# Patient Record
Sex: FEMALE | Race: WHITE | NOT HISPANIC OR LATINO | Employment: FULL TIME | URBAN - METROPOLITAN AREA
[De-identification: names, ages, dates, MRNs, and addresses within clinical notes are randomized per-mention and may not be internally consistent; named-entity substitution may affect disease eponyms.]

---

## 2018-10-10 ENCOUNTER — APPOINTMENT (OUTPATIENT)
Dept: WOMENS IMAGING | Facility: HOSPITAL | Age: 48
End: 2018-10-10

## 2018-10-10 PROCEDURE — 77067 SCR MAMMO BI INCL CAD: CPT | Performed by: RADIOLOGY

## 2018-10-10 PROCEDURE — 77063 BREAST TOMOSYNTHESIS BI: CPT | Performed by: RADIOLOGY

## 2018-10-10 PROCEDURE — MDREVIEWSP: Performed by: RADIOLOGY

## 2018-10-14 ENCOUNTER — TELEPHONE (OUTPATIENT)
Dept: SURGERY | Facility: CLINIC | Age: 48
End: 2018-10-14

## 2018-10-14 DIAGNOSIS — R92.8 ABNORMAL MAMMOGRAM: Primary | ICD-10-CM

## 2018-10-15 ENCOUNTER — TELEPHONE (OUTPATIENT)
Dept: SURGERY | Facility: CLINIC | Age: 48
End: 2018-10-15

## 2018-10-15 NOTE — TELEPHONE ENCOUNTER
Scheduled Bilateral Breast U/S at [facility]  10-17-18 @ 2:30  Return to see Dr WILEY 11-8-18 arrive 11:15    LM for pt to call    kota

## 2018-10-17 ENCOUNTER — APPOINTMENT (OUTPATIENT)
Dept: WOMENS IMAGING | Facility: HOSPITAL | Age: 48
End: 2018-10-17

## 2018-10-17 PROCEDURE — 76641 ULTRASOUND BREAST COMPLETE: CPT | Performed by: RADIOLOGY

## 2018-10-25 ENCOUNTER — TELEPHONE (OUTPATIENT)
Dept: SURGERY | Facility: CLINIC | Age: 48
End: 2018-10-25

## 2018-10-25 NOTE — TELEPHONE ENCOUNTER
[facility] October 17th 2018 bilateral breast ultrasound: 1 focal asymmetries additional evaluation no discrete or suspicious sonographic abnormality.  Finding 2 solid mass with partially defined margins 2.6 x 2.4 cm subcutaneous left breast 5:00, 2 cm from the nipple.  Benign.  BI-RADS 2

## 2018-11-08 ENCOUNTER — OFFICE VISIT (OUTPATIENT)
Dept: SURGERY | Facility: CLINIC | Age: 48
End: 2018-11-08

## 2018-11-08 VITALS
WEIGHT: 117 LBS | DIASTOLIC BLOOD PRESSURE: 76 MMHG | HEART RATE: 68 BPM | SYSTOLIC BLOOD PRESSURE: 122 MMHG | BODY MASS INDEX: 19.97 KG/M2 | HEIGHT: 64 IN | OXYGEN SATURATION: 99 %

## 2018-11-08 DIAGNOSIS — D24.2 FIBROADENOMA OF LEFT BREAST: Primary | ICD-10-CM

## 2018-11-08 DIAGNOSIS — N60.12 FIBROCYSTIC DISEASE OF LEFT BREAST: ICD-10-CM

## 2018-11-08 PROCEDURE — 99213 OFFICE O/P EST LOW 20 MIN: CPT | Performed by: SURGERY

## 2018-11-08 NOTE — PROGRESS NOTES
Chief Complaint: Lula Reyes is a 48 y.o. female who was seen in consultation at the request of No ref. provider found  for Fibroadenoma of breast left and a followup visit    History of Present Illness:  Patient presents with a LEFT breast mass, abnormal LEFT breast imaging, and LEFT skin changes. She has noted a LEFT breast mass for a few years that has not changed in size, LEFT LOQ, mild associated tenderness with palpation, no skin changes at this site. She has assumed that this was a cyst.  She has had imaging surveillance of this since 9-2014 that has shown the lesion to be stable at 2 cm.    She noted in February to March LEFt breast skin changes between 6-8:00. She noted no mass, associated nipple changes at that time. Resolved with antibiotics.    She noted no other masses, skin changes, nipple discharge, nipple changes prior to her most recent imaging.   Her most recent imaging includes:  06-22-12       Mammo Screening Bilateral     [facility]      Lula Reyes  This is a baseline study.    The breast are extremely dense    Birads Category 1:  Negative  09-04-14       Yearly Screening Mammogram     [facility]                         AmyFriedat  Bilateral Mammogrphy  The breast are extremely dense,  Finding 1:  asymmetry measuring 18 millimeters posterior one-third superior left rbeast  located 11 centimeters from the nipple.  Finding 2:  fat containing, oval mass 2 cm anterior one-third of the left breast at 5 o'clock  This finding is  a hamartoma.  Bilateral Ultrasound  Finding 1:  No suspicious sonographic abnormality is seen  Finding 2:  elongated area of mixed echognology   Finding 3:  several small simple cysts.  Impression:  Finding 1:  Old Films are required  Finding 2:  Benign-Negative  Finding 3:  Benign-Negative  Birads Category 0:    10-28-14       Left Diagnostic Mammography        [facility]                          AmyLula  The breast are extremely dense  Finding 1:   asymmetry in the left breast does not persist.  Finding 2:  oval mass in the left breast fat containing, oval mass 2 cm anterior one-third region  left breast at 5 o'clock.  This finding is a hamartoma.  Left Breast Ultrasound:  Finding 1:  Additionl evaluation asymmetry in the left breast.  There is not evidence of any solid mass or abnormal cystic elements  Finding 3:  few oval simple cysts.  Impression:  Finding 1:  Benign-Negative  Finding 2:  Probably Benign, Follow Up Mammography in 6 months.  Follow Up Ultrasound of the left rbast in 6 months.  Finding 3:  Benign-Negative  Birads Category 3:  Probably Benign    06-09-15       Left Diagnostic Mammogram        [facility]                             Lula Reyes  Finding 1:  Follow-up oval mass in the left breast, 5 o'clock  fat containing, oval mass 2 cm anterior one-third left rbast at 5 o'clock. Mass correlates  to the palpable abnormality in the left breast at 5 o'clock  Finding 2:  Follow up asymmetry in the left breast, superior stable asymmetry measuring 18  millimeters in the posterior one-third superior. left breast located 11 centimeters from the nipple.  Left Breast Ultrasound  Finding 2:  no evidence of any solid mass or abnormal cystic elements.    Finding 3: simple cyst.  Impression:  Finding 1:  Anterior one-third left breast 5 o'clock is probably benign.  ultrasound of the left breast in 6 months.  Finding 2:  asymmetry posterior one-third superior left breast 11 centimeters from the nipple is benign-negative.  Finding 3:  Benign-Negative  Birads Category 3:  Probably Benign    She has not had  a breast biopsy in the past.  She has her uterus and ovaries, is premenopausal, and takes no hormones.  Her family history includes the following: She has no sisters, one brother, no aunts or uncles. No breast cancer or ovarian cancer in her family history.         In the interim,  Mrs. Reyes  has done well.  She has noted no changes in her breast exam. No  new masses, skin changes,  nipple changes, nipple discharge either breast.     Her most recent imaging includes the followin/08/15    [facility]      BILATERAL SCREENING MAMMOGRAM WITH TOMOSYNTHESIS                Lula Reyes   The breasts are extremely dense.   Finding 1: stable asymmetric breast tissue seen in both breasts.   LEFT BREAST ULTRASOUND   Finding 2: followup oval mass left breast 5 o'clock 17 x 5 x 10 mm anterior one-third left breast at 5 o'clock. No significant change.   Finding 3: Oval complicated cyst 6 mm left breast 2 o'clock.   IMPRESSION:   Finding 1: benign negative   Finding 2: solid mass anterior one third left breast 5 o'clock probably benign. Ultrasound of the left breast in 6 months.   Finding 3: Complicated cyst left breast at 2 o'clock is probably benign. Ultrasound of the left breast in 6 months.   BIRADS Category 3: Probably benign     She has started back the sertraline.    In the interim,  Lula Reyes  has done well.  She has noted no changes in her breast exam. No new masses, skin changes, nipple changes, nipple discharge either breast.     Her most recent imaging includes the following:  [facility] 2016 the 2 cysts in surveillance left breast 2:00 and left breast 12:00 were stable and felt to be benign.  The solid mass left breast 5:00 anterior one third measured 2.4 cm.  This had increased from 2.0 cm.  BI-RADS 4A.  They then performed an ultrasound guided biopsy and this returned as benign fibrofatty breast tissue with apocrine metaplasia, ductal dilation and ectasia, fibroadenomatoid hyperplasia and sclerosing adenosis.    She is here to follow-up those results and to discuss management.      Interval History:  In the interim,  Lula Reyes  has done well.  She has noted no changes in her breast exam. No new masses, skin changes, nipple changes, nipple discharge either breast.     Her most recent imaging includes the  following:  Most recent imaging includes 10-10-18 bilateral screen mammogram with 3D at [facility]- showed extremely dense tissue- new focal asymmetries bilateral requires additional imaging with ultrasound- BR0.      [facility] October 17th 2018 bilateral breast ultrasound: 1 focal asymmetries additional evaluation no discrete or suspicious sonographic abnormality.  Finding 2 solid mass with partially defined margins 2.6 x 2.4 cm subcutaneous left breast 5:00, 2 cm from the nipple.  Benign.  BI-RADS 2  No significant change from priors.    She is here to review.      Review of Systems:  Review of Systems   Constitutional: Negative for unexpected weight change (5.8lb weight loss).   All other systems reviewed and are negative.       Past Medical and Surgical History:  Breast Biopsy History:  Fibroadenoma of breast, left and a followup visit  Breast Cancer HIstory:  Patient does not have a past medical history of breast cancer.  Breast Operations, and year:  none  Obstetric/Gynecologic History:  Age menstrual periods began: 13 yrs old   Patient is premenopausal, first day of last period: mid july 2015.  Premenopausal, regular.  Number of pregnancies: 1  Number of live births: 0  Number of abortions or miscarriages: 1  Age of delivery of first child: n/a  breast feeding: n/a  Length of time taking birth control pills: n/a  Patient has never taken hormone replacement    Past Surgical History:   Procedure Laterality Date   • LEEP N/A 3/7/2016    Procedure: LOOP ELECTROCAUTERY EXCISION PROCEDURE;  Surgeon: Erica Hinton MD;  Location: [facility];  Service:    • MYRINGOTOMY W/ TUBES     • TONSILLECTOMY         Past Medical History:   Diagnosis Date   • Asthma    • Dysplasia of cervix    • PONV (postoperative nausea and vomiting)        Prior Hospitalizations, other than for surgery or childbirth, and year:  n/a    Social History     Social History   • Marital status: Unknown     Spouse name: N/A   • Number of  "children: N/A   • Years of education: N/A     Occupational History   • Not on file.     Social History Main Topics   • Smoking status: Never Smoker   • Smokeless tobacco: Not on file   • Alcohol use Not on file      Comment: OCCASIONALLY   • Drug use: No   • Sexual activity: Not on file     Other Topics Concern   • Not on file     Social History Narrative   • No narrative on file     Patient is .  Patient is employed full time with the following occupation:   Patient drinks 2 servings of caffeine per day.    Family History:  Family History   Problem Relation Age of Onset   • No Known Problems Mother    • No Known Problems Father    • No Known Problems Sister    • No Known Problems Brother    • No Known Problems Maternal Grandmother    • No Known Problems Maternal Grandfather    • No Known Problems Paternal Grandmother    • No Known Problems Paternal Grandfather        Vital Signs:  /76 (BP Location: Left arm, Patient Position: Sitting, Cuff Size: Adult)   Pulse 68   Ht 162.6 cm (64\")   Wt 53.1 kg (117 lb)   SpO2 99%   BMI 20.08 kg/m²      Medications:    Current Outpatient Prescriptions:   •  albuterol (PROVENTIL HFA;VENTOLIN HFA) 108 (90 BASE) MCG/ACT inhaler, Inhale 2 puffs every 4 (four) hours as needed for shortness of breath., Disp: , Rfl:   •  Calcium Carbonate-Vit D-Min (CALCIUM 1200 PO), Take  by mouth., Disp: , Rfl:   •  Cholecalciferol (VITAMIN D3) 31389 UNITS tablet, Take 1 tablet by mouth 1 (one) time per week. Weekly in winter Monthly in summer , Disp: , Rfl:   •  FLUoxetine (PROzac) 10 MG capsule, Take 10 mg by mouth every morning., Disp: , Rfl:   •  magnesium 30 MG tablet, Take  by mouth 2 (two) times a day., Disp: , Rfl:   •  Multiple Vitamins-Minerals (IMMUNE SYSTEM BOOSTER PO), Take  by mouth., Disp: , Rfl:      Allergies:  No Known Allergies    Physical Examination:  General Appearance:  Patient is in no distress.  She is well kept and has a  thin  build. "   Psychiatric:  Patient with appropriate mood and affect. Alert and oriented to self, time, and place.    Breast, RIGHT: large  sized, symmetric with the contralateral side.  Breast skin is without erythema, edema, rashes.  There are no visible abnormalities upon inspection during the arm-raising maneuver or with hands on hips in the sitting position. There is no nipple retraction, discharge or nipple/areolar skin changes.There are no masses palpable in the sitting or supine positions.    Breast, LEFT:  large medium  sized, symmetric with the contralateral side.  Breast skin is without erythema, edema, rashes.  There are no visible abnormalities upon inspection during the arm-raising maneuver or with hands on hips in the sitting position. There is no nipple retraction, discharge or nipple/areolar skin changes. There is a stable 3 cm palpable mass that is nontender at the LEFT 5:00 4 CFN location. This is the site of known biopsy proven fibroadenomatoid changes and FCC. More appreciable on exam in the supine versus the sitting position. No overlying skin changes. There are no other masses palpable in the sitting or supine positions.     Lymphatic:  There is no axillary, cervical, infraclavicular, or supraclavicular adenopathy bilaterally.  Eyes:  Pupils are round and reactive to light.  Cardiovascular:  Heart rate and rhythm are regular.  Respiratory:  Lungs are clear bilaterally with no crackles or wheezes in any lung field.  Gastrointestinal:  Abdomen is soft, nondistended, and nontender.  Musculoskeletal:  Good strength in all 4 extremities.  There is good range of motion in both shoulders.   Skin:  No new skin lesions or rashes on the skin excluding the breast (see breast exam above).            Imagin12       Mammo Screening Bilateral     [facility]  This is a baseline study.    The breast are extremely dense    Birads Category 1:  Negative  14       Yearly Screening  Mammogram     [facility]                         Lula Reyes  Bilateral Mammogrphy  The breast are extremely dense,  Finding 1:  asymmetry measuring 18 millimeters posterior one-third superior left rbeast  located 11 centimeters from the nipple.  Finding 2:  fat containing, oval mass 2 cm anterior one-third of the left breast at 5 o'clock  This finding is  a hamartoma.  Bilateral Ultrasound  Finding 1:  No suspicious sonographic abnormality is seen  Finding 2:  elongated area of mixed echognology   Finding 3:  several small simple cysts.  Impression:  Finding 1:  Old Films are required  Finding 2:  Benign-Negative  Finding 3:  Benign-Negative  Birads Category 0:    10-28-14       [facility]  The [facility] are extremely dense  Finding 1:  asymmetry in the left breast does not persist.  Finding 2:  oval mass in the left breast fat containing, oval mass 2 cm anterior one-third region  left breast at 5 o'clock.  This finding is a hamartoma.  Left Breast Ultrasound:  Finding 1:  Additionl evaluation asymmetry in the left breast.  There is not evidence of any solid mass or abnormal cystic elements  Finding 3:  few oval simple cysts.  Impression:  Finding 1:  Benign-Negative  Finding 2:  Probably Benign, Follow Up Mammography in 6 months.  Follow Up Ultrasound of the left rbast in 6 months.  Finding 3:  Benign-Negative  Birads Category 3:  Probably Benign    06-09-15       Left Diagnostic Mammogram        [facility]                             Lula Reyes  Finding 1:  Follow-up oval mass in the left breast, 5 o'clock  fat containing, oval mass 2 cm anterior one-third left rbast at 5 o'clock. Mass correlates  to the palpable abnormality in the left breast at 5 o'clock  Finding 2:  Follow up asymmetry in the left breast, superior stable asymmetry measuring 18  millimeters in the posterior one-third superior. left breast located 11 centimeters from the  nipple.  Left Breast Ultrasound  Finding 2:  no evidence of any solid mass or abnormal cystic elements.    Finding 3: simple cyst.  Impression:  Finding 1:  Anterior one-third left breast 5 o'clock is probably benign.  ultrasound of the left breast in 6 months.  Finding 2:  asymmetry posterior one-third superior left breast 11 centimeters from the nipple is benign-negative.  Finding 3:  Benign-Negative  Birads Category 3:  Probably Benign    09/08/15    [facility]      BILATERAL SCREENING MAMMOGRAM WITH TOMOSYNTHESIS                Lula Padron   The breasts are extremely dense.   Finding 1: stable asymmetric breast tissue seen in both breasts.   LEFT BREAST ULTRASOUND   Finding 2: followup oval mass left breast 5 o'clock 17 x 5 x 10 mm anterior one-third left breast at 5 o'clock. No significant change.   Finding 3: Oval complicated cyst 6 mm left breast 2 o'clock.   IMPRESSION:   Finding 1: benign negative   Finding 2: solid mass anterior one third left breast 5 o'clock probably benign. Ultrasound of the left breast in 6 months.   Finding 3: Complicated cyst left breast at 2 o'clock is probably benign. Ultrasound of the left breast in 6 months.   BIRADS Category 3: Probably benign     03-18-16                               [facility]                       LEFT BREAST ULTRASOUND               LULA PADRON  Findings 1. Solid mass in the left breast, 5 o’clock 18i4h12 mm anterior one-third left breast at 5 o’clock  Increased since the prior exam.  Finding 2: Follow-up complicated cyst in the left breast, 2 o’clock 5x2x5 in the left breast at 2 o’clock.  Finding 3: Oval septated cyst 6x3x6 mm left breast at 12 o’clock   IMPRESSION:  Finding 1: Anterior one-third region of the left breast at 5 o’clock is suspicious. Ultrasound guided vacuum assisted biopsy is recommended.  Finding 2: Benign-negative  Finding 3: Benign- negative  BI-RADS Category 4A    3-23-16  [facility]  LEFT ULTRASOUND   LULA PADRON  Left ultrasound needle  core biopsy 3-23-16.    3-23-16                         [facility]                         ULTRASOUND GUIDED CLIP                       MATT PADRON  Ultrasound guided clip placement on 3-23-16  ADDENDUM 3-25-16  Pathology of the enlarging 2.4 cm mass 5:00 left breast has returned apocrine metaplasia, ductal dilatation and ectasia, fibroadenomatold hyperplasia and sclerosing adenosis.Left six months follow up ultrasound is recommended. Pathology is concordant. Post clip mammogram demonstrates good position of the “minicork” shaped clip.       Most recent imaging includes 10-10-18 bilateral screen mammogram with 3D at [facility]- showed extremely dense tissue- new focal asymmetries bilateral requires additional imaging with ultrasound- BR0.    [facility] October 17th 2018 bilateral breast ultrasound: 1 focal asymmetries additional evaluation no discrete or suspicious sonographic abnormality.  Finding 2 solid mass with partially defined margins 2.6 x 2.4 cm subcutaneous left breast 5:00, 2 cm from the nipple.  Benign.  BI-RADS 2  No significant change from priors.       Pathology:     3-23-16  [facility]  LEFT ULTRASOUND GUIDED BIOPSY  MATT PADRON  5:00 position left breast, 12 gauge core needle device.  Multiple cores Minicork shaped s-sony tissue marker was placed at the biopsy site through the biopsy device.    ADDENDUM:  3-25-16  Left ultrasound needle core biopsy.    Pathology of the enlarging 2.4 cm mass 5:00 left breast has returned apocrine metaplasia, ductal dilatation and ectasia, fibroadenomatoid hyperplasia and sclerosing adenosis.  Left six month follow up ultrasound is recommended.  Pathology is concordant.  Post clip mammogram demonstrates good position of the “Min[place]” shaped clip and no hematoma.      03/23/16     PCA PATHOLOGY REPORT  MATT PADRON  Mass, Left Breast Tissue at 5:00, Ultrasound Biopsy;  Benign fibrofatty breast tissue showing apocrine metaplasia, ductal dilatation and  ectasia, fibroadenomatoid hyperplasia and sclerosing adenosis.    Procedures:      Assessment:   Diagnosis Plan   1. Fibroadenoma of left breast     2. Fibrocystic disease of left breast       1-2  LEFT breast mass felt to be hamartoma on imaging- 3 cm on exam, 2 cm on imaging. Known and stable since 9-2014. 1,7 cm in 9-2015 BR3 US- increased to 2.4 cm in 3-2016- core biopsy 3-23-16 returned as benign fibrofatty breast tissue with apocrine metaplasia, ductal dilation and ectasia, fibroadenomatoid hyperplasia and sclerosing adenosis.    Stable on her ultrasound  [facility]. BR2     Plan:  Mrs. Reyes is doing well today.  We reviewed her interval history, interval imaging reports, and examination together today.    We reviewed the nature of fibrocystic changes as well as of fibroadenomatoid changes. We reviewed that her exam and imaging are stable today at her followup.    We discussed that her breast tissue is very dense and I recommend continuing 3-D mammography for surveillance. We reviewed the extreme density of her breast tissue. We discussed MRI and screening ultrasound as adjuncts for her to consider.   MRI has very specific indications and is very frequently denied and is quite expensive. Ultrasound is a less sensitive screening adjunct, but may not have the same cost considerations over the long term.    She thinks that she may consider adding ultrasound to her routine screening simply because she feels that she gets call backs nearly each mammogram.    She is going to discuss this further with Dr Brown.  I did give her the reminder that her next screening mammogram is due November 11, 2019.      I asked her to continue a soft breast exam monthly, and to call in the future with any concerns or changes and we will be happy to see her back.      Genevieve Wooten MD    We spent 15 minin visit, 9 in face to face       Next Appointment:  Return for any future concerns.

## 2020-01-03 ENCOUNTER — APPOINTMENT (OUTPATIENT)
Dept: WOMENS IMAGING | Facility: HOSPITAL | Age: 50
End: 2020-01-03

## 2020-01-03 PROCEDURE — 77063 BREAST TOMOSYNTHESIS BI: CPT | Performed by: RADIOLOGY

## 2020-01-03 PROCEDURE — 77080 DXA BONE DENSITY AXIAL: CPT | Performed by: RADIOLOGY

## 2020-01-03 PROCEDURE — 77067 SCR MAMMO BI INCL CAD: CPT | Performed by: RADIOLOGY
